# Patient Record
Sex: MALE
[De-identification: names, ages, dates, MRNs, and addresses within clinical notes are randomized per-mention and may not be internally consistent; named-entity substitution may affect disease eponyms.]

---

## 2020-11-23 ENCOUNTER — TRANSCRIPTION ENCOUNTER (OUTPATIENT)
Age: 50
End: 2020-11-23

## 2021-08-02 ENCOUNTER — APPOINTMENT (OUTPATIENT)
Dept: SURGERY | Facility: CLINIC | Age: 51
End: 2021-08-02
Payer: COMMERCIAL

## 2021-08-02 VITALS
BODY MASS INDEX: 25.77 KG/M2 | HEIGHT: 70 IN | DIASTOLIC BLOOD PRESSURE: 72 MMHG | HEART RATE: 45 BPM | TEMPERATURE: 98.1 F | WEIGHT: 180 LBS | SYSTOLIC BLOOD PRESSURE: 113 MMHG | OXYGEN SATURATION: 97 %

## 2021-08-02 DIAGNOSIS — R10.32 LEFT LOWER QUADRANT PAIN: ICD-10-CM

## 2021-08-02 DIAGNOSIS — S33.8XXA SPRAIN OF OTHER PARTS OF LUMBAR SPINE AND PELVIS, INITIAL ENCOUNTER: ICD-10-CM

## 2021-08-02 DIAGNOSIS — Z87.891 PERSONAL HISTORY OF NICOTINE DEPENDENCE: ICD-10-CM

## 2021-08-02 DIAGNOSIS — Z78.9 OTHER SPECIFIED HEALTH STATUS: ICD-10-CM

## 2021-08-02 DIAGNOSIS — Z83.79 FAMILY HISTORY OF OTHER DISEASES OF THE DIGESTIVE SYSTEM: ICD-10-CM

## 2021-08-02 PROCEDURE — 99203 OFFICE O/P NEW LOW 30 MIN: CPT

## 2021-08-03 NOTE — HISTORY OF PRESENT ILLNESS
[de-identified] : He is a 51-year-old white male complaining of left inguinal pain which commenced about 2 months ago.  Over the last few days the pain  has completely subsided.  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  He states I repaired her left inguinal hernia on him over 10 years ago.\par

## 2021-08-03 NOTE — PLAN
[FreeTextEntry1] : Impression: Left groin sprain resolved.\par \par Plan: No surgical intervention needed.

## 2021-08-03 NOTE — PHYSICAL EXAM
[de-identified] : General: No acute distress, conversant, well-nourished.  Eyes: PERRLA, EOMI, anicteric.  Conjunctiva are noninjected and moist.  Vision is grossly intact.  ENT: Hearing is grossly intact.  There is no nasal discharge.  Ears and nose are symmetrical and atraumatic.  Nasal, oral, and oral pharyngeal mucosa are pink and moist with no evidence of ulceration.  Head/neck: Head is normocephalic.  Neck is supple.  Carotids have good upstroke.  Trachea is in the midline.  No thyromegaly.  Respiratory: Lungs are clear to auscultation with good inspiratory effort.  No rales, rhonchi or wheezing.  Cardiovascular: Heart is regular in rate and rhythm with no murmurs.  Abdomen: Soft and nontender.  Good bowel sounds present in all 4 quadrants.  No guarding, no rebound, and no peritoneal signs.  No evidence of hepatosplenomegaly.  No evidence of abdominal wall hernias.  Inguinal regions are unremarkable with no evidence of hernias.  Lymphatics: There is no evidence of masses, or lymphadenopathy in the head, neck, trunk, axillary, supraclavicular, or inguinal regions.  Extremities: Extremities reveal no gross deformities, good range of motion, no evidence of edema, no varicosities, no lymphadenopathy and peripheral pulses are intact.  Skin: Good color, turgor, texture with no gross lesions, no eruptions or rashes, no subcutaneous nodules and normal temperature.  Psychiatric: Awake, alert, and oriented x3 with an appropriate affect.

## 2021-08-03 NOTE — CONSULT LETTER
[Dear  ___] : Dear  [unfilled], [Sincerely,] : Sincerely, [FreeTextEntry1] : I saw Kameron Martinez in the office today.  He is a 51-year-old white male complaining of left inguinal pain which commenced about 2 months ago.  Over the last few days the pain  has completely subsided.  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  He states I repaired a left inguinal hernia on him over 10 years ago.\par \par His past medical history is unremarkable.  His past surgical history is as above, and significant for appendectomy as a child, and knee surgery secondary to a chainsaw injury.\par \par He has a 40-pack-year history of smoking and quit smoking 4 years ago.  He occasionally drinks alcohol.  He has no known drug allergies.  He takes no medications.\par \par His father is alive and in good health.  His mother  in her 50s from cirrhosis of the liver.  Review of systems was performed and documented.\par \par General: No acute distress, conversant, well-nourished.  Eyes: PERRLA, EOMI, anicteric.  Conjunctiva are noninjected and moist.  Vision is grossly intact.  ENT: Hearing is grossly intact.  There is no nasal discharge.  Ears and nose are symmetrical and atraumatic.  Nasal, oral, and oral pharyngeal mucosa are pink and moist with no evidence of ulceration.  Head/neck: Head is normocephalic.  Neck is supple.  Carotids have good upstroke.  Trachea is in the midline.  No thyromegaly.  Respiratory: Lungs are clear to auscultation with good inspiratory effort.  No rales, rhonchi or wheezing.  Cardiovascular: Heart is regular in rate and rhythm with no murmurs.  Abdomen: Soft and nontender.  Good bowel sounds present in all 4 quadrants.  No guarding, no rebound, and no peritoneal signs.  No evidence of hepatosplenomegaly.  No evidence of abdominal wall hernias.  Inguinal regions are unremarkable with no evidence of hernias.  Lymphatics: There is no evidence of masses, or lymphadenopathy in the head, neck, trunk, axillary, supraclavicular, or inguinal regions.  Extremities: Extremities reveal no gross deformities, good range of motion, no evidence of edema, no varicosities, no lymphadenopathy and peripheral pulses are intact.  Skin: Good color, turgor, texture with no gross lesions, no eruptions or rashes, no subcutaneous nodules and normal temperature.  Psychiatric: Awake, alert, and oriented x3 with an appropriate affect.\par \par Impression: Left groin sprain resolved.\par \par Plan: No surgical intervention needed. [FreeTextEntry3] : EVITA Roca\par , Surgery [facility]\par

## 2022-12-22 ENCOUNTER — APPOINTMENT (OUTPATIENT)
Dept: ORTHOPEDIC SURGERY | Facility: CLINIC | Age: 52
End: 2022-12-22

## 2022-12-22 VITALS
HEIGHT: 70 IN | WEIGHT: 180 LBS | SYSTOLIC BLOOD PRESSURE: 125 MMHG | BODY MASS INDEX: 25.77 KG/M2 | DIASTOLIC BLOOD PRESSURE: 73 MMHG | HEART RATE: 65 BPM

## 2022-12-22 DIAGNOSIS — M25.552 PAIN IN LEFT HIP: ICD-10-CM

## 2022-12-22 PROCEDURE — 99203 OFFICE O/P NEW LOW 30 MIN: CPT

## 2022-12-22 PROCEDURE — 73502 X-RAY EXAM HIP UNI 2-3 VIEWS: CPT

## 2022-12-22 NOTE — DISCUSSION/SUMMARY
[de-identified] : ENMANUEL BOUDREAUX is a 52 year old male who presents with left buttock pain with minimal left hip degenerative changes. Given the limited extent of his joint space narrowing and the localization of his pain to his buttock and pain-free hip ROM, his pain is likely related to his spine. As such, the patient was referred to physiatry for further evaluation of his pain if his pain gets exacerbated again, as he states it is not significantly bothering him at the moment.

## 2022-12-22 NOTE — CONSULT LETTER
[Dear  ___] : Dear  [unfilled], [Consult Letter:] : I had the pleasure of evaluating your patient, [unfilled]. [Please see my note below.] : Please see my note below. [Consult Closing:] : Thank you very much for allowing me to participate in the care of this patient.  If you have any questions, please do not hesitate to contact me. [Sincerely,] : Sincerely, [FreeTextEntry2] : CRUZ FRIEND MD\par  [FreeTextEntry3] : Vadim Fernandez MD\par Chief of Joint Replacement\par [facility] \par [facility] [place]\par \par

## 2022-12-22 NOTE — PHYSICAL EXAM
[de-identified] : The patient appears well nourished  and in no apparent distress.  The patient is alert and oriented to person, place, and time.   Affect and mood appear normal. The head is normocephalic and atraumatic.  The eyes reveal normal sclera and extra ocular muscles are intact. The tongue is midline with no apparent lesions.  Skin shows normal turgor with no evidence of eczema or psoriasis.  No respiratory distress noted.  Sensation grossly intact.		  [de-identified] : Exam of the left hip shows hip flexion of 120 degrees, hip external rotation of 60 degrees, hip internal rotation of 30 degrees. There is no tenderness over the ischial tuberosity. \par 5/5 motor strength bilaterally distally. Sensation intact distally.		  [de-identified] : X-ray: AP view of the pelvis and 2 views of the left hip demonstrate minimal joint space narrowing.

## 2022-12-22 NOTE — HISTORY OF PRESENT ILLNESS
[de-identified] : Patient is a 52-year-old male presenting for evaluation of months of intermittent left hip pain and buttock pain.  He notes at times the hip pain is localized to the groin, other times it is localized to the buttock and radiates slightly down the left leg.  He states weightbearing activity as well as exercise aggravate the hip.  He is not having any stiffness, no difficulty putting on socks and shoes.  He has not had injections thus far.  She tried therapy without significant improvement.  Takes anti-inflammatories and Tylenol without relief.

## 2022-12-22 NOTE — ADDENDUM
[FreeTextEntry1] : This note was authored by Fabien Meyers working as a medical scribe for Dr. Vadim Fernandez. The note was reviewed, edited, and revised by Dr. Vadim Fernandez whom is in agreement with the exam findings, imaging findings, and treatment plan. 12/22/2022

## 2023-01-26 ENCOUNTER — OUTPATIENT (OUTPATIENT)
Dept: OUTPATIENT SERVICES | Facility: HOSPITAL | Age: 53
LOS: 1 days | End: 2023-01-26
Payer: COMMERCIAL

## 2023-01-26 ENCOUNTER — RESULT REVIEW (OUTPATIENT)
Age: 53
End: 2023-01-26

## 2023-01-26 ENCOUNTER — APPOINTMENT (OUTPATIENT)
Dept: RADIOLOGY | Facility: CLINIC | Age: 53
End: 2023-01-26
Payer: COMMERCIAL

## 2023-01-26 ENCOUNTER — APPOINTMENT (OUTPATIENT)
Dept: PHYSICAL MEDICINE AND REHAB | Facility: CLINIC | Age: 53
End: 2023-01-26
Payer: COMMERCIAL

## 2023-01-26 VITALS
HEIGHT: 70 IN | SYSTOLIC BLOOD PRESSURE: 115 MMHG | WEIGHT: 175 LBS | HEART RATE: 51 BPM | DIASTOLIC BLOOD PRESSURE: 70 MMHG | RESPIRATION RATE: 14 BRPM | BODY MASS INDEX: 25.05 KG/M2

## 2023-01-26 DIAGNOSIS — M47.816 SPONDYLOSIS W/OUT MYELOPATHY OR RADICULOPATHY, LUMBAR REGION: ICD-10-CM

## 2023-01-26 DIAGNOSIS — M79.18 MYALGIA, OTHER SITE: ICD-10-CM

## 2023-01-26 DIAGNOSIS — Z78.9 OTHER SPECIFIED HEALTH STATUS: ICD-10-CM

## 2023-01-26 DIAGNOSIS — M54.50 LOW BACK PAIN, UNSPECIFIED: ICD-10-CM

## 2023-01-26 PROCEDURE — 99204 OFFICE O/P NEW MOD 45 MIN: CPT | Mod: GC

## 2023-01-26 PROCEDURE — 72100 X-RAY EXAM L-S SPINE 2/3 VWS: CPT

## 2023-01-26 PROCEDURE — 72100 X-RAY EXAM L-S SPINE 2/3 VWS: CPT | Mod: 26

## 2023-01-26 NOTE — ASSESSMENT
[FreeTextEntry1] : Mr. ENMANUEL BOUDREAUX is a 52 year old male who presents with low back pain for the past six months, currently minimal. Likely myofascial but may also be from underlying spondylosis or stenosis. Denies red flag signs. Will recommend:\par - Hip XR by Dr Fernandez reviewed by me\par - Will order dedicated X-ray L Spine given persistent pain. \par - Start PT 2-3x/week for stretching, strengthening (especially of core muscles), ROM exercises, HEP and modalities PRN including myofascial release, moist heat \par \par RTC in 3-4 weeks. Patient aware of red flag signs including any changes to their bowel/bladder control, groin numbness or new weakness. Patient knows to seek immediate attention by calling 911 or going to nearest ER if these symptoms appear.

## 2023-01-26 NOTE — PHYSICAL EXAM
[FreeTextEntry1] : PE:\par Constitutional: In NAD, calm and cooperative\par MSK (Back)\par 	Inspection: no gross swelling identified\par 	Palpation: Tenderness of the left QL near thoracolumbar border, left piriformis\par 	ROM: L upper lumbar pain with extension, no pain with flexion\par 	Strength: 5/5 strength in bilateral lower extremities\par 	Reflexes: 2+ Patella reflex bilaterally, 2+ Achilles reflex bilaterally, negative clonus bilaterally\par 	Sensation: Intact to light touch in bilateral lower extremities\par Special tests:\par SLR: negative\par LO: negative\par FADIR: negative\par Facet loading: negative\par Able to heel and toe walk.

## 2023-01-26 NOTE — DATA REVIEWED
[FreeTextEntry1] : X-ray taken by Dr. Fernandez on 12/22/22 reviewed by me: mild left hip joint space narrowing seen, mild lower lumbar degenerative changes seen

## 2023-01-26 NOTE — HISTORY OF PRESENT ILLNESS
[FreeTextEntry1] : Mr. ENMANUEL BOUDREAUX is a 52 year old male who presents with low back pain. \par \par Location:L buttock\par Onset: 6 months ago, no inciting event or trauma\par Provocation/Palliative: worse with activity\par Quality: aching\par Radiation: none currently\par Severity: Mild right now but can be moderate\par Timing: intermittent\par \par Denies groin pain. Denies any associated numbness. Denies any associated leg weakness. Denies any loss of bowel/bladder control or any groin numbness.\par Previous medications trialed: none\par Previous procedures relevant to complaint: none\par Conservative therapy tried?: chiropractic care, HEP. 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

## 2023-01-30 DIAGNOSIS — S32.009A UNSPECIFIED FRACTURE OF UNSPECIFIED LUMBAR VERTEBRA, INITIAL ENCOUNTER FOR CLOSED FRACTURE: ICD-10-CM
